# Patient Record
Sex: FEMALE | Race: BLACK OR AFRICAN AMERICAN | URBAN - METROPOLITAN AREA
[De-identification: names, ages, dates, MRNs, and addresses within clinical notes are randomized per-mention and may not be internally consistent; named-entity substitution may affect disease eponyms.]

---

## 2022-03-07 ENCOUNTER — TELEPHONE ENCOUNTER (OUTPATIENT)
Dept: URBAN - METROPOLITAN AREA CLINIC 36 | Facility: CLINIC | Age: 37
End: 2022-03-07

## 2022-03-09 ENCOUNTER — OFFICE VISIT (OUTPATIENT)
Dept: URBAN - METROPOLITAN AREA CLINIC 35 | Facility: CLINIC | Age: 37
End: 2022-03-09

## 2022-04-20 ENCOUNTER — OFFICE VISIT (OUTPATIENT)
Dept: URBAN - METROPOLITAN AREA CLINIC 35 | Facility: CLINIC | Age: 37
End: 2022-04-20

## 2022-04-20 NOTE — HPI-CONSTIPATION
Patient presents today for constipation. Patient states symptoms began ___. Patient admits /denies taking OTC with/out relief of symptoms.Currently, having _ bowel movement a day/week. Stools are _ with/out the presence of blood, mucus, and melena. Patient admits /denies strenuous bowel movements.   Patient admits /denies having recent abdominal Xray. Patient admits /denies having recent labs.

## 2022-04-20 NOTE — HPI-ABDOMINAL PAIN
37 year old female patient presents today for abdominal pain consult. Patient admits she has been experiencing symptoms since ____. Patient was seen at [facility] ER on 01.27.2022 for her symptoms.Patient describes symptoms as ____.  Patient states symptoms are located ___.  Patient admits symptoms are more present during /after ____. Patient admits /denies nausea or vomiting.   Patient admits /denies having any recent imaging.  Patient admits/denies EGD in the past.

## 2022-04-22 ENCOUNTER — TELEPHONE ENCOUNTER (OUTPATIENT)
Dept: URBAN - METROPOLITAN AREA CLINIC 33 | Facility: CLINIC | Age: 37
End: 2022-04-22

## 2022-04-22 ENCOUNTER — OFFICE VISIT (OUTPATIENT)
Dept: URBAN - METROPOLITAN AREA CLINIC 33 | Facility: CLINIC | Age: 37
End: 2022-04-22

## 2022-04-22 NOTE — HPI-ABDOMINAL PAIN
37 year old female patient presents today for abdominal pain consult. Patient admits she has been experiencing symptoms since ____. Patient was seen at [facility] ER on 01.27.2022 for her symptoms. Patient describes symptoms as ____.  Patient states symptoms are located ___.  Patient admits symptoms are more present during /after ____. Patient admits /denies nausea or vomiting.   Patient admits /denies having any recent imaging.  Patient admits/denies EGD in the past.

## 2022-04-22 NOTE — HPI-CONSTIPATION
Patient presents today for constipation. Patient states symptoms began ___. Patient admits/denies taking OTC with/out relief of symptoms.Currently, having _ bowel movement a day/week. Stools are _ with/out the presence of blood, mucus, and melena. Patient admits /denies strenuous bowel movements.   Patient admits /denies having recent abdominal Xray. Patient admits /denies having recent labs.

## 2022-05-20 ENCOUNTER — TELEPHONE ENCOUNTER (OUTPATIENT)
Dept: URBAN - METROPOLITAN AREA CLINIC 23 | Facility: CLINIC | Age: 37
End: 2022-05-20

## 2022-05-26 ENCOUNTER — OFFICE VISIT (OUTPATIENT)
Dept: URBAN - METROPOLITAN AREA CLINIC 33 | Facility: CLINIC | Age: 37
End: 2022-05-26

## 2022-06-23 ENCOUNTER — DASHBOARD ENCOUNTERS (OUTPATIENT)
Age: 37
End: 2022-06-23

## 2022-06-23 ENCOUNTER — OFFICE VISIT (OUTPATIENT)
Dept: URBAN - METROPOLITAN AREA CLINIC 98 | Facility: CLINIC | Age: 37
End: 2022-06-23
Payer: COMMERCIAL

## 2022-06-23 ENCOUNTER — WEB ENCOUNTER (OUTPATIENT)
Dept: URBAN - METROPOLITAN AREA CLINIC 98 | Facility: CLINIC | Age: 37
End: 2022-06-23

## 2022-06-23 VITALS
TEMPERATURE: 97.9 F | SYSTOLIC BLOOD PRESSURE: 186 MMHG | WEIGHT: 226 LBS | HEIGHT: 65 IN | BODY MASS INDEX: 37.65 KG/M2 | DIASTOLIC BLOOD PRESSURE: 107 MMHG | HEART RATE: 88 BPM

## 2022-06-23 DIAGNOSIS — R63.4 WEIGHT LOSS: ICD-10-CM

## 2022-06-23 DIAGNOSIS — K62.5 RECTAL BLEEDING: ICD-10-CM

## 2022-06-23 PROCEDURE — 99204 OFFICE O/P NEW MOD 45 MIN: CPT | Performed by: INTERNAL MEDICINE

## 2022-06-23 NOTE — HPI-TODAY'S VISIT:
gas and bloating taking Miralax every other day hemorrhoids bleeding issues worse past 6 months has lost weight does not eat red meat s/p cholecystectomy

## 2022-07-20 ENCOUNTER — OFFICE VISIT (OUTPATIENT)
Dept: URBAN - METROPOLITAN AREA CLINIC 35 | Facility: CLINIC | Age: 37
End: 2022-07-20

## 2022-10-12 ENCOUNTER — OFFICE VISIT (OUTPATIENT)
Dept: URBAN - METROPOLITAN AREA SURGERY CENTER 18 | Facility: SURGERY CENTER | Age: 37
End: 2022-10-12